# Patient Record
(demographics unavailable — no encounter records)

---

## 2024-11-22 NOTE — DEVELOPMENTAL MILESTONES
Pt tolerated lying flat well, O2 sat remained stable and pt denied difficulty breathing.   [See scanned document for history] : See scanned document for history [Verbally] : verbally [Right] : right

## 2024-11-26 NOTE — PHYSICAL EXAM
[FreeTextEntry1] : GENERAL: alert, cooperative, in NAD SKIN: The skin is intact, warm, pink and dry over the area examined. EYES: Normal conjunctiva, normal eyelids and pupils were equal and round. ENT: normal ears, normal nose and normal lips. CARDIOVASCULAR: brisk capillary refill, but no peripheral edema. RESPIRATORY: The patient is in no apparent respiratory distress. They're taking full deep breaths without use of accessory muscles or evidence of audible wheezes or stridor without the use of a stethoscope. Normal respiratory effort. ABDOMEN: not examined.   Left Foot: There is no sign of bony deformity Mild swelling about the great toe  Small area of ecchymosis about the left great toe Moderate tenderness to palpation about the proximal phalanx/IP joint of the great toe No evidence of nailbed injury Full active and passive range of motion of the foot with no discomfort.  Toes are warm, pink, and moving freely.  No tenderness with palpation of bony prominence or soft tissue.    EHL/FHL is intact  Sensation intact to light touch.  2+ DP pulses palpated.  Brisk capillary refill in all toes.

## 2024-11-26 NOTE — REVIEW OF SYSTEMS
[Change in Activity] : change in activity [Limping] : limping [Joint Pains] : arthralgias [Fever Above 102] : no fever [Malaise] : no malaise [Rash] : no rash [Redness] : no redness [Sore Throat] : no sore throat [Murmur] : no murmur [Vomiting] : no vomiting [Diarrhea] : no diarrhea [Constipation] : no constipation [Kidney Infection] : denies kidney infection [Bladder Infection] : denies bladder infection [Joint Swelling] : joint swelling  [Sleep Disturbances] : ~T no sleep disturbances

## 2024-11-26 NOTE — REASON FOR VISIT
[Initial Evaluation] : an initial evaluation [Patient] : patient [Mother] : mother [FreeTextEntry1] : Left great toe fracture sustained 11/15/2024

## 2024-11-26 NOTE — DATA REVIEWED
[de-identified] : Left great toe 3 view radiographs were obtained and independently reviewed during today's visit. There is a nondisplaced intra-articular fracture of the proximal phalanx of the great toe. No signs of interval healing.  Alignment is anatomic.

## 2024-11-26 NOTE — HISTORY OF PRESENT ILLNESS
[FreeTextEntry1] : Felicia is a 15-year-old female with a left great toe fracture sustained on 11/15/2024.  Per reports she fell down 4-5 stairs.  She had immediate pain and discomfort and the following day presented [facility] where [facility]ographs were obtained and a fracture was noted.  She was provided with a Darco shoe and it was recommended she follow-up with pediatric orthopedics.  Today, she reports continued mild discomfort about the great toe.  She been weightbearing both in and out of her Darco shoe.  She denies any need for pain medication.  She presents today for initial evaluation of her left great toe fracture.

## 2024-11-26 NOTE — DATA REVIEWED
[de-identified] : Left great toe 3 view radiographs were obtained and independently reviewed during today's visit. There is a nondisplaced intra-articular fracture of the proximal phalanx of the great toe. No signs of interval healing.  Alignment is anatomic.

## 2024-11-26 NOTE — END OF VISIT
[FreeTextEntry3] : IMino MD, personally saw and evaluated the patient and developed the plan as documented above. I concur or have edited the note as appropriate.

## 2024-11-26 NOTE — ASSESSMENT
[FreeTextEntry1] : 15 year old female with a left great toe fracture sustained on 11/15/24, 1 week ago, when she fell down the stairs.   -We discussed the history, physical exam, and all available radiographs at length during today's visit with patient and her parent/guardian who served as an independent historian due to child's age and unreliable nature of history. -Left great toe 3 view radiographs were obtained and independently reviewed during today's visit. There is a nondisplaced intra-articular fracture of the proximal phalanx of the great toe. No signs of interval healing.  Alignment is anatomic. -The etiology, pathoanatomy, treatment modalities, and expected natural history of the injury were discussed at length today. -Clinically, she has pain about the fracture site. She is able to bear weight in her DARCO shoe. -Based on current fracture morphology and alignment, recommendation is to continue with her DARCO shoe. She may weight bear as tolerated on the left lower extremity while in her shoe. -OTC NSAIDs as needed -Absolutely no running, jumping, gym, sports, rough play, etc.  School note provided today. -Intricacies of intra-articular fractures discussed including possible risk for future posttraumatic arthritis, stiffness, pain, etc. -We will plan to see her back in clinic in approximately 3 weeks for reevaluation and new left great toe radiographs.    All questions and concerns were addressed today. Parent and patient verbalize understanding and agree with plan of care.   I, Katie Spence, have acted as a scribe and documented the above information for Dr. Lao.

## 2024-12-20 NOTE — DATA REVIEWED
[de-identified] : Left great toe 3 view radiographs were obtained and independently reviewed during today's visit. There is a nondisplaced intra-articular fracture of the proximal phalanx of the great toe. Now with signs of interval healing.  Alignment is anatomic.

## 2024-12-20 NOTE — ASSESSMENT
[FreeTextEntry1] : 15 year old female with a left great toe fracture sustained on 11/15/24, 4 weeks ago, when she fell down the stairs.  Overall doing well.  -We discussed the interval progress, physical exam, and all available radiographs at length during today's visit with patient and her parent/guardian who served as an independent historian due to child's age and unreliable nature of history. - Left great toe 3 view radiographs were obtained and independently reviewed during today's visit. There is a nondisplaced intra-articular fracture of the proximal phalanx of the great toe. Now with signs of interval healing.  Alignment is anatomic. -The etiology, pathoanatomy, treatment modalities, and expected natural history of the injury were discussed at length today. -Clinically, she has improved pain about the fracture site. She is able to bear weight both in and out of her DARCO shoe. -Based on radiographs obtained today recommendation at this time is to discontinue her Darco shoe and weight-bear in regular shoes. -OTC NSAIDs as needed -She should continue to refrain from running, jumping, gym, sports, rough play, etc.  School note provided today. -Intricacies of intra-articular fractures discussed including possible risk for future posttraumatic arthritis, stiffness, pain, etc. -We will plan to see her back in clinic in approximately 4 weeks for reevaluation and new left great toe radiographs.   All questions and concerns were addressed today. Parent and patient verbalize understanding and agree with plan of care.   I, Katie Spence, have acted as a scribe and documented the above information for Dr. Lao   This note was created using Dragon Voice Recognition Software and may have been partially created using WorkThink software which was then reviewed and edited to the best of my ability. Sporadic inaccurate translation may have occurred. If there are any questions about content of the note, please contact the office for clarification.

## 2024-12-20 NOTE — REASON FOR VISIT
[Follow Up] : a follow up visit [Patient] : patient [Mother] : mother [FreeTextEntry1] : Left great toe fracture sustained 11/15/2024

## 2024-12-20 NOTE — REVIEW OF SYSTEMS
[Change in Activity] : change in activity [Limping] : limping [Joint Pains] : arthralgias [Joint Swelling] : joint swelling  [Fever Above 102] : no fever [Malaise] : no malaise [Rash] : no rash [Redness] : no redness [Sore Throat] : no sore throat [Murmur] : no murmur [Vomiting] : no vomiting [Diarrhea] : no diarrhea [Constipation] : no constipation [Kidney Infection] : denies kidney infection [Bladder Infection] : denies bladder infection [Sleep Disturbances] : ~T no sleep disturbances

## 2024-12-20 NOTE — PHYSICAL EXAM
[FreeTextEntry1] : GENERAL: alert, cooperative, in NAD SKIN: The skin is intact, warm, pink and dry over the area examined. EYES: Normal conjunctiva, normal eyelids and pupils were equal and round. ENT: normal ears, normal nose and normal lips. CARDIOVASCULAR: brisk capillary refill, but no peripheral edema. RESPIRATORY: The patient is in no apparent respiratory distress. They're taking full deep breaths without use of accessory muscles or evidence of audible wheezes or stridor without the use of a stethoscope. Normal respiratory effort. ABDOMEN: not examined.   Left Foot: There is no sign of bony deformity Mild swelling about the great toe  No further area of ecchymosis about the left great toe Moderate tenderness to palpation about the proximal phalanx/IP joint of the great toe, improved from prior visit No evidence of nailbed injury Full active and passive range of motion of the foot with no discomfort.  Toes are warm, pink, and moving freely.  No tenderness with palpation of bony prominence or soft tissue.    EHL/FHL is intact  Sensation intact to light touch.  2+ DP pulses palpated.  Brisk capillary refill in all toes.  Gait: Ambulates with a normal and steady heel-to-toe gait without assistive devices. She bears equal weight across bilateral lower extremities. No evidence of a limp.

## 2024-12-20 NOTE — HISTORY OF PRESENT ILLNESS
[FreeTextEntry1] : Felicia is a 15-year-old female with a left great toe fracture sustained on 11/15/2024.  Per reports she fell down 4-5 stairs.  She had immediate pain and discomfort and the following day presented to [facility] where radiographs were obtained and a fracture was noted.  She was provided with a Darco shoe and it was recommended she follow-up with pediatric orthopedics.  On initial evaluation her Darco shoe was continued.  Today, she reports improvement in the discomfort about the great toe.  She been weightbearing both in and out of her Darco shoe.  She denies any need for pain medication.  She presents today for continued management of her left great toe fracture.

## 2025-01-21 NOTE — ASSESSMENT
[FreeTextEntry1] : 15 year old female with a left great toe fracture sustained on 11/15/24, 9 weeks ago, when she fell down the stairs.  Overall doing well.  -We discussed the interval progress, physical exam, and all available radiographs at length during today's visit with patient and her parent/guardian who served as an independent historian due to child's age and unreliable nature of history. -Left great toe 3 view radiographs were obtained and independently reviewed during today's visit. There is a well healed nondisplaced intra-articular fracture of the proximal phalanx of the great toe.  Alignment is anatomic. -The etiology, pathoanatomy, treatment modalities, and expected natural history of the injury were again discussed at length today. -Clinically, she has improved pain about the fracture site. She is able to bear weight without a limp. She has returned to dancee. -Based on radiographs obtained today recommendation at this time is to continue to weight-bear in regular shoes. -She may return to activity as tolerated. A school note was provided today. -Intricacies of intra-articular fractures discussed including possible risk for future posttraumatic arthritis, stiffness, pain, etc. -We will plan to see her back in clinic on an as needed basis or if new concerns arise.   All questions and concerns were addressed today. Parent and patient verbalize understanding and agree with plan of care.   I, Katie Spence, have acted as a scribe and documented the above information for Dr. Lao   This note was created using Dragon Voice Recognition Software and may have been partially created using [employer] software which was then reviewed and edited to the best of my ability. Sporadic inaccurate translation may have occurred. If there are any questions about content of the note, please contact the office for clarification.

## 2025-01-21 NOTE — ASSESSMENT
[FreeTextEntry1] : 15 year old female with a left great toe fracture sustained on 11/15/24, 9 weeks ago, when she fell down the stairs.  Overall doing well.  -We discussed the interval progress, physical exam, and all available radiographs at length during today's visit with patient and her parent/guardian who served as an independent historian due to child's age and unreliable nature of history. -Left great toe 3 view radiographs were obtained and independently reviewed during today's visit. There is a well healed nondisplaced intra-articular fracture of the proximal phalanx of the great toe.  Alignment is anatomic. -The etiology, pathoanatomy, treatment modalities, and expected natural history of the injury were again discussed at length today. -Clinically, she has improved pain about the fracture site. She is able to bear weight without a limp. She has returned to dancee. -Based on radiographs obtained today recommendation at this time is to continue to weight-bear in regular shoes. -She may return to activity as tolerated. A school note was provided today. -Intricacies of intra-articular fractures discussed including possible risk for future posttraumatic arthritis, stiffness, pain, etc. -We will plan to see her back in clinic on an as needed basis or if new concerns arise.   All questions and concerns were addressed today. Parent and patient verbalize understanding and agree with plan of care.   I, Katie Spence, have acted as a scribe and documented the above information for Dr. Lao   This note was created using Dragon Voice Recognition Software and may have been partially created using [employer] software which was then reviewed and edited to the best of my ability. Sporadic inaccurate translation may have occurred. If there are any questions about content of the note, please contact the office for clarification.

## 2025-01-21 NOTE — PHYSICAL EXAM
[FreeTextEntry1] : GENERAL: alert, cooperative, in NAD SKIN: The skin is intact, warm, pink and dry over the area examined. EYES: Normal conjunctiva, normal eyelids and pupils were equal and round. ENT: normal ears, normal nose and normal lips. CARDIOVASCULAR: brisk capillary refill, but no peripheral edema. RESPIRATORY: The patient is in no apparent respiratory distress. They're taking full deep breaths without use of accessory muscles or evidence of audible wheezes or stridor without the use of a stethoscope. Normal respiratory effort. ABDOMEN: not examined.   Left Foot: There is no sign of bony deformity Mild residual swelling about the great toe  No further area of ecchymosis about the left great toe No further tenderness to palpation about the proximal phalanx/IP joint of the great toe, improved from prior visit No evidence of nailbed injury Full active and passive range of motion of the foot with no discomfort.  Toes are warm, pink, and moving freely.  No tenderness with palpation of bony prominence or soft tissue.    EHL/FHL is intact  Sensation intact to light touch.  2+ DP pulses palpated.  Brisk capillary refill in all toes.  Gait: Ambulates with a normal and steady heel-to-toe gait without assistive devices. She bears equal weight across bilateral lower extremities. No evidence of a limp.

## 2025-01-21 NOTE — REVIEW OF SYSTEMS
[Change in Activity] : no change in activity [Fever Above 102] : no fever [Malaise] : no malaise [Rash] : no rash [Redness] : no redness [Sore Throat] : no sore throat [Murmur] : no murmur [Vomiting] : no vomiting [Diarrhea] : no diarrhea [Constipation] : no constipation [Kidney Infection] : denies kidney infection [Bladder Infection] : denies bladder infection [Limping] : no limping [Joint Pains] : no arthralgias [Joint Swelling] : no joint swelling [Sleep Disturbances] : ~T no sleep disturbances

## 2025-01-21 NOTE — DATA REVIEWED
[de-identified] : Left great toe 3 view radiographs were obtained and independently reviewed during today's visit. There is a well healed nondisplaced intra-articular fracture of the proximal phalanx of the great toe.  Alignment is anatomic.

## 2025-01-21 NOTE — DATA REVIEWED
[de-identified] : Left great toe 3 view radiographs were obtained and independently reviewed during today's visit. There is a well healed nondisplaced intra-articular fracture of the proximal phalanx of the great toe.  Alignment is anatomic.

## 2025-01-21 NOTE — HISTORY OF PRESENT ILLNESS
[FreeTextEntry1] : Felicia is a 15-year-old female with a left great toe fracture sustained on 11/15/2024.  Per reports she fell down 4-5 stairs.  She had immediate pain and discomfort and the following day presented to [facility] where radiographs were obtained and a fracture was noted.  She was provided with a Darco shoe and it was recommended she follow-up with pediatric orthopedics.  On initial evaluation her Darco shoe was continued. On 12/20/24 no further immobilization was warranted. Please see prior clinic notes for additional information.   Today, she reports improvement in the discomfort about the great toe.  She been weightbearing in a regular shoe.  She has been back to dance. She denies any need for pain medication.  She presents today for continued management of her left great toe fracture.